# Patient Record
Sex: MALE | Race: BLACK OR AFRICAN AMERICAN | Employment: FULL TIME | URBAN - NONMETROPOLITAN AREA
[De-identification: names, ages, dates, MRNs, and addresses within clinical notes are randomized per-mention and may not be internally consistent; named-entity substitution may affect disease eponyms.]

---

## 2021-03-21 ENCOUNTER — APPOINTMENT (OUTPATIENT)
Dept: GENERAL RADIOLOGY | Age: 31
End: 2021-03-21
Attending: EMERGENCY MEDICINE
Payer: COMMERCIAL

## 2021-03-21 ENCOUNTER — HOSPITAL ENCOUNTER (EMERGENCY)
Age: 31
Discharge: HOME | End: 2021-03-21
Attending: EMERGENCY MEDICINE
Payer: COMMERCIAL

## 2021-03-21 VITALS
RESPIRATION RATE: 18 BRPM | TEMPERATURE: 97.7 F | SYSTOLIC BLOOD PRESSURE: 145 MMHG | DIASTOLIC BLOOD PRESSURE: 80 MMHG | HEART RATE: 76 BPM | WEIGHT: 190 LBS | HEIGHT: 69 IN | BODY MASS INDEX: 28.14 KG/M2 | OXYGEN SATURATION: 98 %

## 2021-03-21 DIAGNOSIS — S93.492A SPRAIN OF ANTERIOR TALOFIBULAR LIGAMENT OF LEFT ANKLE, INITIAL ENCOUNTER: Primary | ICD-10-CM

## 2021-03-21 PROCEDURE — 99283 EMERGENCY DEPT VISIT LOW MDM: CPT

## 2021-03-21 PROCEDURE — 73630 X-RAY EXAM OF FOOT: CPT

## 2021-03-21 PROCEDURE — 74011250637 HC RX REV CODE- 250/637: Performed by: EMERGENCY MEDICINE

## 2021-03-21 RX ORDER — IBUPROFEN 800 MG/1
800 TABLET ORAL
Qty: 20 TAB | Refills: 0 | Status: SHIPPED | OUTPATIENT
Start: 2021-03-21 | End: 2021-03-28

## 2021-03-21 RX ORDER — IBUPROFEN 800 MG/1
800 TABLET ORAL ONCE
Status: COMPLETED | OUTPATIENT
Start: 2021-03-21 | End: 2021-03-21

## 2021-03-21 RX ADMIN — IBUPROFEN 800 MG: 800 TABLET, FILM COATED ORAL at 12:03

## 2021-03-21 NOTE — ED TRIAGE NOTES
Pt reports he was [employer] last night and fell injuring generalized left foot. Pt reports \"I can't hardly put pressure on it. \"    Pt has full ROM on LLE and pedal pulses are intact. Pt reports sever pain with ambulation and placed in a wheelchair in triage for comfort.

## 2021-03-21 NOTE — ED NOTES
I have reviewed discharge instructions with the patient. The patient verbalized understanding. Pt verbally confirmed understanding of need for follow up with primary care provider. Important sections of discharge instructions highlighted for patient. Jeananne Hashimoto, RN      Pt is not in any current distress and shows no evidence of clinical instability. Pt is hemodynamically/respiratorily stable. Armband removed. Paperwork given by provider and reviewed with patient and their family member, opportunity for questions/clarification given. Pt denies any additional complaints. Pt ambulated out of ED.     Patient Vitals for the past 4 hrs:   Temp Pulse Resp BP SpO2   03/21/21 1201     98 %   03/21/21 1149    (!) 145/80    03/21/21 1146 97.7 °F (36.5 °C) 76 18  98 %         Jeananne Hashimoto, RN

## 2021-03-21 NOTE — LETTER
[address] [facility] EMERGENCY DEPT 
[address] 
[phone] Work/School Note Date: 3/21/2021 To Whom It May concern: 
 
Nevin Brady was seen and treated today in the emergency room by the following provider(s): 
Attending Provider: Tyree Lugo MD. Nevin Brady is excused from work/school on 03/21/21 and 03/22/21. He is medically clear to return to work/school on 3/23/2021.   
 
 
Sincerely, 
 
 
 
 
Emi Mcarthur MD

## 2021-03-21 NOTE — ED PROVIDER NOTES
EMERGENCY DEPARTMENT HISTORY AND PHYSICAL EXAM      Date: 3/21/2021  Patient Name: Sukhjinder Zayas    History of Presenting Illness     Chief Complaint   Patient presents with    Foot Injury       History Provided By: Patient    HPI: Sukhjinder Zayas, 27 y.o. male with a past medical history significant asthma presents to the ED with cc of left ankle foot injury last night while skating. Injury to lateral foot ankle unsure of exact mechanism. There are no other complaints, changes, or physical findings at this time. PCP: Pippa Solano MD    No current facility-administered medications on file prior to encounter. No current outpatient medications on file prior to encounter. Past History     Past Medical History:  Past Medical History:   Diagnosis Date    Asthma        Past Surgical History:  History reviewed. No pertinent surgical history. Family History:  History reviewed. No pertinent family history. Social History:  Social History     Tobacco Use    Smoking status: Current Every Day Smoker    Smokeless tobacco: Never Used   Substance Use Topics    Alcohol use: Yes    Drug use: Never       Allergies:  No Known Allergies      Review of Systems   Review of all other systems negative  Review of Systems    Physical Exam   Pleasant young male no acute distress  Physical Exam  Musculoskeletal: Normal range of motion. General: Swelling and tenderness present. No deformity. Left foot: Normal range of motion. No deformity. Feet:       Comments: Tenderness and swelling of the lateral ankle proximal fifth metatarsal no gross deformity pulses are 2+ dorsalis pedis posterior tibial;   Feet:      Comments: Swelling tenderness and ecchymosis; questionable mild tenderness of the proximal fifth metatarsal  Skin:     General: Skin is warm and dry. Capillary Refill: Capillary refill takes less than 2 seconds.          Lab and Diagnostic Study Results     Labs -   No results found for this or any previous visit (from the past 12 hour(s)). Radiologic Studies -   @lastxrresult@  CT Results  (Last 48 hours)    None        CXR Results  (Last 48 hours)    None            Medical Decision Making   - I am the first provider for this patient. - I reviewed the vital signs, available nursing notes, past medical history, past surgical history, family history and social history. - Initial assessment performed. The patients presenting problems have been discussed, and they are in agreement with the care plan formulated and outlined with them. I have encouraged them to ask questions as they arise throughout their visit. Vital Signs-Reviewed the patient's vital signs. Patient Vitals for the past 12 hrs:   Temp Pulse Resp BP SpO2   03/21/21 1201     98 %   03/21/21 1149    (!) 145/80    03/21/21 1146 97.7 °F (36.5 °C) 76 18  98 %       Records Reviewed: Nursing Notes and Old Medical Records    The patient presents with left foot ankle injury with a differential diagnosis of fracture sprain dislocation contusion      ED Course:          Provider Notes (Medical Decision Making):   X-ray of the left foot shows a focal small free calcific body in the lateral ankle region that appears chronic there is no significant anatomical fracture-awaiting final radiology interpretation  MDM       Procedures   Medical Decision Makingedical Decision Making  Performed by: Emily Horton MD  PROCEDURES:  Procedures       Disposition   Disposition: Condition unchanged and stable  DC- Adult Discharges: All of the diagnostic tests were reviewed and questions answered. Diagnosis, care plan and treatment options were discussed. The patient understands the instructions and will follow up as directed. The patients results have been reviewed with them. They have been counseled regarding their diagnosis.   The patient verbally convey understanding and agreement of the signs, symptoms, diagnosis, treatment and prognosis and additionally agrees to follow up as recommended with their PCP in 24 - 48 hours. They also agree with the care-plan and convey that all of their questions have been answered. I have also put together some discharge instructions for them that include: 1) educational information regarding their diagnosis, 2) how to care for their diagnosis at home, as well a 3) list of reasons why they would want to return to the ED prior to their follow-up appointment, should their condition change. DISCHARGE PLAN:  1. There are no discharge medications for this patient. 2.   Follow-up Information    None       3. Return to ED if worse   4. There are no discharge medications for this patient. Diagnosis     Clinical Impression: Left ankle foot sprain  Attestations:    Suyapa Nye MD    Please note that this dictation was completed with [employer], the computer voice recognition software. Quite often unanticipated grammatical, syntax, homophones, and other interpretive errors are inadvertently transcribed by the computer software. Please disregard these errors. Please excuse any errors that have escaped final proofreading. Thank you.

## 2024-06-05 ENCOUNTER — HOSPITAL ENCOUNTER (EMERGENCY)
Facility: HOSPITAL | Age: 34
Discharge: HOME | End: 2024-06-05
Attending: EMERGENCY MEDICINE
Payer: OTHER MISCELLANEOUS

## 2024-06-05 VITALS
OXYGEN SATURATION: 98 % | TEMPERATURE: 98.1 F | SYSTOLIC BLOOD PRESSURE: 137 MMHG | HEIGHT: 70 IN | WEIGHT: 225 LBS | HEART RATE: 87 BPM | BODY MASS INDEX: 32.21 KG/M2 | RESPIRATION RATE: 18 BRPM | DIASTOLIC BLOOD PRESSURE: 88 MMHG

## 2024-06-05 DIAGNOSIS — S51.811A LACERATION OF RIGHT FOREARM, INITIAL ENCOUNTER: Primary | ICD-10-CM

## 2024-06-05 PROCEDURE — 6370000000 HC RX 637 (ALT 250 FOR IP): Performed by: EMERGENCY MEDICINE

## 2024-06-05 PROCEDURE — 2500000003 HC RX 250 WO HCPCS

## 2024-06-05 PROCEDURE — 90471 IMMUNIZATION ADMIN: CPT

## 2024-06-05 PROCEDURE — 99284 EMERGENCY DEPT VISIT MOD MDM: CPT

## 2024-06-05 PROCEDURE — 90714 TD VACC NO PRESV 7 YRS+ IM: CPT

## 2024-06-05 PROCEDURE — 12002 RPR S/N/AX/GEN/TRNK2.6-7.5CM: CPT

## 2024-06-05 PROCEDURE — 6360000002 HC RX W HCPCS

## 2024-06-05 RX ORDER — LIDOCAINE HYDROCHLORIDE 10 MG/ML
INJECTION, SOLUTION EPIDURAL; INFILTRATION; INTRACAUDAL; PERINEURAL
Status: COMPLETED
Start: 2024-06-05 | End: 2024-06-05

## 2024-06-05 RX ORDER — LIDOCAINE HYDROCHLORIDE 10 MG/ML
5 INJECTION, SOLUTION EPIDURAL; INFILTRATION; INTRACAUDAL; PERINEURAL
Status: COMPLETED | OUTPATIENT
Start: 2024-06-05 | End: 2024-06-05

## 2024-06-05 RX ORDER — BACITRACIN ZINC AND POLYMYXIN B SULFATE 500; 1000 [USP'U]/G; [USP'U]/G
OINTMENT TOPICAL
Qty: 15 G | Refills: 1 | Status: SHIPPED | OUTPATIENT
Start: 2024-06-05 | End: 2024-06-12

## 2024-06-05 RX ORDER — BACITRACIN ZINC 500 [USP'U]/G
OINTMENT TOPICAL ONCE
Status: COMPLETED | OUTPATIENT
Start: 2024-06-05 | End: 2024-06-05

## 2024-06-05 RX ADMIN — LIDOCAINE HYDROCHLORIDE 5 ML: 10 INJECTION, SOLUTION EPIDURAL; INFILTRATION; INTRACAUDAL; PERINEURAL at 20:32

## 2024-06-05 RX ADMIN — CLOSTRIDIUM TETANI TOXOID ANTIGEN (FORMALDEHYDE INACTIVATED) AND CORYNEBACTERIUM DIPHTHERIAE TOXOID ANTIGEN (FORMALDEHYDE INACTIVATED) 0.5 ML: 5; 2 INJECTION, SUSPENSION INTRAMUSCULAR at 20:32

## 2024-06-05 RX ADMIN — BACITRACIN ZINC: 500 OINTMENT TOPICAL at 20:50

## 2024-06-05 ASSESSMENT — PAIN - FUNCTIONAL ASSESSMENT
PAIN_FUNCTIONAL_ASSESSMENT: NONE - DENIES PAIN
PAIN_FUNCTIONAL_ASSESSMENT: 0-10
PAIN_FUNCTIONAL_ASSESSMENT: PREVENTS OR INTERFERES SOME ACTIVE ACTIVITIES AND ADLS

## 2024-06-05 ASSESSMENT — PAIN DESCRIPTION - LOCATION: LOCATION: ARM

## 2024-06-05 ASSESSMENT — PAIN DESCRIPTION - ORIENTATION: ORIENTATION: RIGHT

## 2024-06-05 ASSESSMENT — PAIN DESCRIPTION - FREQUENCY: FREQUENCY: CONTINUOUS

## 2024-06-05 ASSESSMENT — PAIN DESCRIPTION - DESCRIPTORS: DESCRIPTORS: SHARP

## 2024-06-05 ASSESSMENT — LIFESTYLE VARIABLES
HOW MANY STANDARD DRINKS CONTAINING ALCOHOL DO YOU HAVE ON A TYPICAL DAY: 3 OR 4
HOW OFTEN DO YOU HAVE A DRINK CONTAINING ALCOHOL: 2-3 TIMES A WEEK

## 2024-06-05 ASSESSMENT — PAIN SCALES - GENERAL: PAINLEVEL_OUTOF10: 7

## 2024-06-05 ASSESSMENT — PAIN DESCRIPTION - PAIN TYPE: TYPE: ACUTE PAIN

## 2024-06-06 ASSESSMENT — ENCOUNTER SYMPTOMS: ROS SKIN COMMENTS: RIGHT FOREARM

## 2024-06-06 NOTE — ED NOTES
[facility] EMERGENCY DEPT  EMERGENCY DEPARTMENT ENCOUNTER      Pt Name: Andres Oh  MRN: [id number]  Birthdate 1990  Date of evaluation: 6/5/2024  Provider: Laurent Fierro MD  8:42 PM    CHIEF COMPLAINT       Chief Complaint   Patient presents with    Laceration         HISTORY OF PRESENT ILLNESS    Andres Oh is a 34 y.o. male who presents to the emergency department with complaint of right forearm laceration.  Laceration was caused by a plier which slipped out of his hands.    Nursing Notes were reviewed.    REVIEW OF SYSTEMS       Review of Systems   Constitutional: Negative.    HENT: Negative.     Skin:  Positive for wound.        Right forearm         Except as noted above the remainder of the review of systems was reviewed and negative.       PAST MEDICAL HISTORY     Past Medical History:   Diagnosis Date    Asthma          SURGICAL HISTORY     No past surgical history on file.      CURRENT MEDICATIONS       Previous Medications    No medications on file       ALLERGIES     Patient has no known allergies.    FAMILY HISTORY     No family history on file.       SOCIAL HISTORY       Social History     Socioeconomic History    Marital status:    Tobacco Use    Smoking status: Every Day    Smokeless tobacco: Never   Substance and Sexual Activity    Alcohol use: Yes    Drug use: Never       SCREENINGS         Sean Coma Scale  Eye Opening: Spontaneous  Best Verbal Response: Oriented  Best Motor Response: Obeys commands  [place] Coma Scale Score: 15                     CIWA Assessment  BP: 137/88  Pulse: 87                 PHYSICAL EXAM       ED Triage Vitals [06/05/24 2002]   BP Temp Temp Source Pulse Respirations SpO2 Height Weight - Scale   137/88 98.1 °F (36.7 °C) Oral 87 18 98 % 1.778 m (5' 10\") 102.1 kg (225 lb)       Physical Exam  Vitals and nursing note reviewed.   Constitutional:       Appearance: Normal appearance.   HENT:      Head: Normocephalic and atraumatic.   Skin:     Findings:

## 2024-06-06 NOTE — ED TRIAGE NOTES
R wrist laceration following working on lawn equipment. Pt has arm wrapped in towel, no blood noted. Pt has feeling in fingers, +2 cap refill. Unknown last tetanus

## 2025-06-05 ENCOUNTER — HOSPITAL ENCOUNTER (EMERGENCY)
Facility: HOSPITAL | Age: 35
Discharge: HOME | End: 2025-06-05
Attending: EMERGENCY MEDICINE
Payer: COMMERCIAL

## 2025-06-05 VITALS
SYSTOLIC BLOOD PRESSURE: 122 MMHG | RESPIRATION RATE: 18 BRPM | HEART RATE: 74 BPM | OXYGEN SATURATION: 99 % | TEMPERATURE: 97.8 F | DIASTOLIC BLOOD PRESSURE: 78 MMHG

## 2025-06-05 DIAGNOSIS — H57.89 EYE IRRITATION: Primary | ICD-10-CM

## 2025-06-05 PROCEDURE — 99283 EMERGENCY DEPT VISIT LOW MDM: CPT

## 2025-06-05 PROCEDURE — 6370000000 HC RX 637 (ALT 250 FOR IP): Performed by: EMERGENCY MEDICINE

## 2025-06-05 RX ORDER — ERYTHROMYCIN 5 MG/G
OINTMENT OPHTHALMIC
Status: COMPLETED | OUTPATIENT
Start: 2025-06-05 | End: 2025-06-05

## 2025-06-05 RX ORDER — ERYTHROMYCIN 5 MG/G
OINTMENT OPHTHALMIC
Qty: 1 G | Refills: 0 | Status: SHIPPED | OUTPATIENT
Start: 2025-06-05 | End: 2025-06-15

## 2025-06-05 RX ORDER — TETRACAINE HYDROCHLORIDE 5 MG/ML
1 SOLUTION OPHTHALMIC
Status: COMPLETED | OUTPATIENT
Start: 2025-06-05 | End: 2025-06-05

## 2025-06-05 RX ADMIN — FLUORESCEIN SODIUM 1 MG: 1 STRIP OPHTHALMIC at 07:10

## 2025-06-05 RX ADMIN — TETRACAINE HYDROCHLORIDE 1 DROP: 5 SOLUTION OPHTHALMIC at 07:10

## 2025-06-05 RX ADMIN — ERYTHROMYCIN: 5 OINTMENT OPHTHALMIC at 07:32

## 2025-06-05 ASSESSMENT — PAIN SCALES - GENERAL: PAINLEVEL_OUTOF10: 6

## 2025-06-05 ASSESSMENT — ENCOUNTER SYMPTOMS
EYE DISCHARGE: 0
EYE PAIN: 0
PHOTOPHOBIA: 0
EYE REDNESS: 1

## 2025-06-05 ASSESSMENT — LIFESTYLE VARIABLES
HOW OFTEN DO YOU HAVE A DRINK CONTAINING ALCOHOL: 2-4 TIMES A MONTH
HOW MANY STANDARD DRINKS CONTAINING ALCOHOL DO YOU HAVE ON A TYPICAL DAY: 3 OR 4

## 2025-06-05 ASSESSMENT — VISUAL ACUITY: OU: 1

## 2025-06-05 ASSESSMENT — PAIN DESCRIPTION - LOCATION: LOCATION: EYE

## 2025-06-05 ASSESSMENT — PAIN DESCRIPTION - PAIN TYPE: TYPE: ACUTE PAIN

## 2025-06-05 ASSESSMENT — PAIN DESCRIPTION - ORIENTATION: ORIENTATION: RIGHT

## 2025-06-05 ASSESSMENT — PAIN - FUNCTIONAL ASSESSMENT: PAIN_FUNCTIONAL_ASSESSMENT: 0-10

## 2025-06-05 NOTE — ED NOTES
[facility] EMERGENCY DEPT  EMERGENCY DEPARTMENT HISTORY AND PHYSICAL EXAM      Date of evaluation: 6/5/2025  Patient Name: Andres Oh  Birthdate 1990  MRN: [id number]  ED Provider: Laurent Fierro MD   Note Started: 7:25 AM EDT 6/5/25    HISTORY OF PRESENT ILLNESS     Chief Complaint   Patient presents with    Foreign Body in Eye       History Provided By: Patient     HPI: Andres Oh is a 35 y.o. male who presents with complaint of foreign body sensation behind the right eye.  He denies eye pain or photophobia.  He cut grass yesterday and there was lot of flying debris.  He did not feel foreign body sensation until later at night.    PAST MEDICAL HISTORY   Past Medical History:  Past Medical History:   Diagnosis Date    Asthma        Past Surgical History:  History reviewed. No pertinent surgical history.    Family History:  History reviewed. No pertinent family history.    Social History:  Social History     Tobacco Use    Smoking status: Every Day     Types: Cigarettes    Smokeless tobacco: Never   Vaping Use    Vaping status: Never Used   Substance Use Topics    Alcohol use: Yes     Comment: occasional    Drug use: Never       Allergies:  No Known Allergies    PCP: Ruchi Rivera FNP    Current Meds:   Current Facility-Administered Medications   Medication Dose Route Frequency Provider Last Rate Last Admin    erythromycin (ROMYCIN) ophthalmic ointment   Right Eye NOW Laurent Fierro MD         Current Outpatient Medications   Medication Sig Dispense Refill    erythromycin (ROMYCIN) 5 MG/GM ophthalmic ointment Apply thin 1 inch strip under lower eyelid 4 times daily 1 g 0       Social Determinants of Health:   Social Drivers of Health     Tobacco Use: High Risk (6/5/2025)    Patient History     Smoking Tobacco Use: Every Day     Smokeless Tobacco Use: Never     Passive Exposure: Not on file   Alcohol Use: Not At Risk (6/5/2025)    AUDIT-C     Frequency of Alcohol Consumption: 2-4 times a month     Average  these medications      erythromycin 5 MG/GM ophthalmic ointment  Commonly known as: ROMYCIN  Apply thin 1 inch strip under lower eyelid 4 times daily               Where to Get Your Medications        These medications were sent to [facility] [address] [place] [address] [address] - P [phone] - F [phone]  [address] [place] [address]      Phone: [phone]   erythromycin 5 MG/GM ophthalmic ointment           DISCONTINUED MEDICATIONS:  Current Discharge Medication List          I am the Primary Clinician of Record. Laurent Fierro MD (electronically signed)    (Please note that parts of this dictation were completed with voice recognition software. Quite often unanticipated grammatical, syntax, homophones, and other interpretive errors are inadvertently transcribed by the computer software. Please disregards these errors. Please excuse any errors that have escaped final proofreading.)       Laurent Fierro MD  06/05/25 [id number]

## 2025-06-05 NOTE — DISCHARGE INSTRUCTIONS
Follow-up with Dr. Pawan Elias, ophthalmologist.  Call for appointment [phone]  Or Dr. Ryan Page [phone]